# Patient Record
Sex: FEMALE | URBAN - METROPOLITAN AREA
[De-identification: names, ages, dates, MRNs, and addresses within clinical notes are randomized per-mention and may not be internally consistent; named-entity substitution may affect disease eponyms.]

---

## 2023-03-01 ENCOUNTER — OFFICE VISIT (OUTPATIENT)
Dept: URBAN - METROPOLITAN AREA CLINIC 26 | Facility: CLINIC | Age: 38
End: 2023-03-01
Payer: COMMERCIAL

## 2023-03-01 DIAGNOSIS — H53.032 STRABISMIC AMBLYOPIA, LEFT EYE: Primary | ICD-10-CM

## 2023-03-01 DIAGNOSIS — H52.13 MYOPIA, BILATERAL: ICD-10-CM

## 2023-03-01 DIAGNOSIS — H53.19 OTHER SUBJECTIVE VISUAL DISTURBANCES: ICD-10-CM

## 2023-03-01 PROCEDURE — 92004 COMPRE OPH EXAM NEW PT 1/>: CPT | Performed by: OPTOMETRIST

## 2023-03-01 ASSESSMENT — VISUAL ACUITY
OS: 20/40
OD: 20/20

## 2023-03-01 ASSESSMENT — INTRAOCULAR PRESSURE
OS: 20
OD: 19

## 2023-03-01 NOTE — IMPRESSION/PLAN
Impression: Other subjective visual disturbances: H53.19. Tunnel vision Plan: Vision changes can last up to 45 minutes not headaches or other neurological conditions Ocular health appears normal OU. monitor for now. consider neurology consult if continues Patient instructed to call if condition gets worse.   Schedule VF
F/U 1 month DE OCT RNFL VF

## 2023-03-01 NOTE — IMPRESSION/PLAN
Impression: Strabismic amblyopia, left eye: H53.032. Plan: History of Strab surgery as a child,   Hx of reduced VA OS. monitor for now. 
F/U 1 month

## 2023-03-22 ENCOUNTER — OFFICE VISIT (OUTPATIENT)
Dept: URBAN - METROPOLITAN AREA CLINIC 26 | Facility: CLINIC | Age: 38
End: 2023-03-22
Payer: COMMERCIAL

## 2023-03-22 DIAGNOSIS — H52.13 MYOPIA, BILATERAL: ICD-10-CM

## 2023-03-22 DIAGNOSIS — H53.19 OTHER SUBJECTIVE VISUAL DISTURBANCES: Primary | ICD-10-CM

## 2023-03-22 DIAGNOSIS — H53.032 STRABISMIC AMBLYOPIA, LEFT EYE: ICD-10-CM

## 2023-03-22 PROCEDURE — 92014 COMPRE OPH EXAM EST PT 1/>: CPT | Performed by: OPTOMETRIST

## 2023-03-22 PROCEDURE — 92083 EXTENDED VISUAL FIELD XM: CPT | Performed by: OPTOMETRIST

## 2023-03-22 ASSESSMENT — INTRAOCULAR PRESSURE
OD: 17
OS: 22

## 2023-03-22 NOTE — IMPRESSION/PLAN
Impression: Strabismic amblyopia, left eye: H53.032. Plan: History of Strab surgery as a child,   Hx of reduced VA OS. monitor for now. consider strab sx if symptomatic in future.

## 2023-03-22 NOTE — IMPRESSION/PLAN
Impression: Other subjective visual disturbances: H53.19. Tunnel vision, cross eyed Plan: VF and RNFL ordered and preformed today OU. VF Full OU. Vision changes can last up to 45 minutes not headaches or other neurological conditions Ocular health appears normal OU. monitor for now. consider neurology consult if continues Patient instructed to call if condition gets worse.   monitor